# Patient Record
Sex: MALE | Race: WHITE | Employment: FULL TIME | URBAN - METROPOLITAN AREA
[De-identification: names, ages, dates, MRNs, and addresses within clinical notes are randomized per-mention and may not be internally consistent; named-entity substitution may affect disease eponyms.]

---

## 2022-03-02 ENCOUNTER — APPOINTMENT (OUTPATIENT)
Dept: RADIOLOGY | Facility: CLINIC | Age: 37
End: 2022-03-02
Attending: EMERGENCY MEDICINE
Payer: COMMERCIAL

## 2022-03-02 ENCOUNTER — HOSPITAL ENCOUNTER (EMERGENCY)
Facility: CLINIC | Age: 37
Discharge: HOME | End: 2022-03-02
Attending: STUDENT IN AN ORGANIZED HEALTH CARE EDUCATION/TRAINING PROGRAM | Admitting: STUDENT IN AN ORGANIZED HEALTH CARE EDUCATION/TRAINING PROGRAM
Payer: COMMERCIAL

## 2022-03-02 VITALS
BODY MASS INDEX: 31.15 KG/M2 | WEIGHT: 230 LBS | OXYGEN SATURATION: 98 % | HEIGHT: 72 IN | RESPIRATION RATE: 16 BRPM | HEART RATE: 87 BPM | TEMPERATURE: 98.5 F | DIASTOLIC BLOOD PRESSURE: 87 MMHG | SYSTOLIC BLOOD PRESSURE: 138 MMHG

## 2022-03-02 DIAGNOSIS — R07.9 CHEST PAIN, UNSPECIFIED TYPE: ICD-10-CM

## 2022-03-02 LAB
ALBUMIN SERPL-MCNC: 4.4 G/DL (ref 3.5–5)
ALP SERPL-CCNC: 83 U/L (ref 45–120)
ALT SERPL W P-5'-P-CCNC: 81 U/L (ref 0–45)
ANION GAP SERPL CALCULATED.3IONS-SCNC: 11 MMOL/L (ref 5–18)
AST SERPL W P-5'-P-CCNC: 50 U/L (ref 0–40)
BASOPHILS # BLD AUTO: 0 10E3/UL (ref 0–0.2)
BASOPHILS NFR BLD AUTO: 0 %
BILIRUB SERPL-MCNC: 0.5 MG/DL (ref 0–1)
BUN SERPL-MCNC: 10 MG/DL (ref 8–22)
CALCIUM SERPL-MCNC: 9.2 MG/DL (ref 8.5–10.5)
CHLORIDE BLD-SCNC: 103 MMOL/L (ref 98–107)
CO2 SERPL-SCNC: 26 MMOL/L (ref 22–31)
CREAT SERPL-MCNC: 0.86 MG/DL (ref 0.7–1.3)
EOSINOPHIL # BLD AUTO: 0.1 10E3/UL (ref 0–0.7)
EOSINOPHIL NFR BLD AUTO: 1 %
ERYTHROCYTE [DISTWIDTH] IN BLOOD BY AUTOMATED COUNT: 12.2 % (ref 10–15)
GFR SERPL CREATININE-BSD FRML MDRD: >90 ML/MIN/1.73M2
GLUCOSE BLD-MCNC: 97 MG/DL (ref 70–125)
HCT VFR BLD AUTO: 45.8 % (ref 40–53)
HGB BLD-MCNC: 15.7 G/DL (ref 13.3–17.7)
HOLD SPECIMEN: NORMAL
IMM GRANULOCYTES # BLD: 0.1 10E3/UL
IMM GRANULOCYTES NFR BLD: 1 %
LYMPHOCYTES # BLD AUTO: 1.8 10E3/UL (ref 0.8–5.3)
LYMPHOCYTES NFR BLD AUTO: 24 %
MCH RBC QN AUTO: 30.1 PG (ref 26.5–33)
MCHC RBC AUTO-ENTMCNC: 34.3 G/DL (ref 31.5–36.5)
MCV RBC AUTO: 88 FL (ref 78–100)
MONOCYTES # BLD AUTO: 0.5 10E3/UL (ref 0–1.3)
MONOCYTES NFR BLD AUTO: 7 %
NEUTROPHILS # BLD AUTO: 5 10E3/UL (ref 1.6–8.3)
NEUTROPHILS NFR BLD AUTO: 67 %
NRBC # BLD AUTO: 0 10E3/UL
NRBC BLD AUTO-RTO: 0 /100
PLATELET # BLD AUTO: 209 10E3/UL (ref 150–450)
POTASSIUM BLD-SCNC: 3.8 MMOL/L (ref 3.5–5)
PROT SERPL-MCNC: 8.3 G/DL (ref 6–8)
RBC # BLD AUTO: 5.21 10E6/UL (ref 4.4–5.9)
SODIUM SERPL-SCNC: 140 MMOL/L (ref 136–145)
TROPONIN I SERPL-MCNC: <0.01 NG/ML (ref 0–0.29)
WBC # BLD AUTO: 7.4 10E3/UL (ref 4–11)

## 2022-03-02 PROCEDURE — 93005 ELECTROCARDIOGRAM TRACING: CPT | Performed by: STUDENT IN AN ORGANIZED HEALTH CARE EDUCATION/TRAINING PROGRAM

## 2022-03-02 PROCEDURE — 36415 COLL VENOUS BLD VENIPUNCTURE: CPT | Performed by: EMERGENCY MEDICINE

## 2022-03-02 PROCEDURE — 84484 ASSAY OF TROPONIN QUANT: CPT | Performed by: EMERGENCY MEDICINE

## 2022-03-02 PROCEDURE — 93005 ELECTROCARDIOGRAM TRACING: CPT | Performed by: EMERGENCY MEDICINE

## 2022-03-02 PROCEDURE — 99284 EMERGENCY DEPT VISIT MOD MDM: CPT | Mod: 25

## 2022-03-02 PROCEDURE — 80053 COMPREHEN METABOLIC PANEL: CPT | Performed by: EMERGENCY MEDICINE

## 2022-03-02 PROCEDURE — 71046 X-RAY EXAM CHEST 2 VIEWS: CPT

## 2022-03-02 PROCEDURE — 85014 HEMATOCRIT: CPT | Performed by: EMERGENCY MEDICINE

## 2022-03-02 NOTE — ED PROVIDER NOTES
Emergency Department Encounter         FINAL IMPRESSION:  Chest pain      ED COURSE AND MEDICAL DECISION MAKING   11:58 AM I met with patient for initial interview and exam. PPE includes surgical mask.     ED Course as of 03/02/22 1249   Wed Mar 02, 2022   1210 Patient is an obese 36-year-old male here with concerns of hypertension and chest pain.  Patient states the chest pain has been going on for months possibly years.  It sounds like patient's wife is currently pregnant and has been struggling with hypertension in pregnancy.  Has been taking his blood pressures at home with her and states that yesterday he noticed his blood pressures in the 150s over 100s.  States he is mildly short of breath at times.  No leg swelling, hemoptysis recent surgeries or recent travel suggesting PE.  He is PERC criteria negative.  Anterior chest discomfort.  States months ago he had radiation to his back however today has no radiation to his back or his abdomen.  On arrival he has some anterior chest discomfort.  States this episode started yesterday.  No fevers chills nausea vomiting diaphoresis or shortness of breath.  No cough.  No urination issues.  Arrival he looks well.  Mildly hypertensive.  Heart and lungs normal.  Abdomen benign.  Plan for labs chest x-ray and reevaluate   1212 EKG is sinus rhythm rate of 93, QTc 422, no signs acute ischemia, no inversions no depressions no STEMI criteria and old EKGs for comparison.   1216 Heart score of 2.  PERC criteria negative     -Labs normal.  Chest x-ray clear.  Plan for discharge home with close outpatient follow-up.  Patient has an appointment tomorrow with his primary care doctor.  Recommending follow-up for his blood pressure.    At the conclusion of the encounter I discussed the results of all the tests and the disposition. The questions were answered. The patient or family acknowledged understanding and was agreeable with the care plan.                  MEDICATIONS GIVEN IN  "THE EMERGENCY DEPARTMENT:  Medications - No data to display    NEW PRESCRIPTIONS STARTED AT TODAY'S ED VISIT:  New Prescriptions    No medications on file       HPI     Patient information obtained from: Patient     Use of Interpretor: N/A     Esteban Ayers is a 36 year old male with a pertinent history of asthma who presents to this ED by walk-in for evaluation of chest pain.    For the past year, patient reports intermittent chest pain that became persistent since 3/1 (a day ago). The pain is located to the left chest. He notes prior instances of radiation to the back but denies any radiation to the back or abdomen recently. Patient describes the pain as a \"dull ache.\" Patient has been stretching without relief. He has been using his pregnant rosemarie's blood pressure monitor at home and has noticed it has been elevated in the 150s/100s lately. At present, patient endorses mild lightheadedness and tingling to the bilateral hands. Patient had a virtual visit for these symptoms this morning and was instructed to present to the ED for evaluation. Denies nausea, vomiting, dizziness, fever, chills, diarrhea, abdominal pain, urinary frequency, dysuria, hematuria, difficulty urinating, diaphoresis, weight loss, hemoptysis, or any other complaints. No personal history of DVT/PE, recent travel, recent surgeries, or family history of significant cardiac issues. Of note, patient mentions he has not had any medical care for several years. He has an appointment with a PCP on 3/3 (tomorrow).    Social history: Patient endorses current cigarette use. Hoping to quit before rosemarie delivers baby.    REVIEW OF SYSTEMS:  Review of Systems   Constitutional: Negative for fever, malaise, chills, diaphoresis, and weight loss  HEENT: Negative runny nose, sore throat, ear pain, neck pain  Respiratory: Negative for shortness of breath, cough, congestion, hemoptysis  Cardiovascular: Negative for leg edema Positive for chest " pain  Gastrointestinal: Negative for abdominal distention, abdominal pain, constipation, vomiting, nausea, diarrhea  Genitourinary: Negative for dysuria, frequency, difficulty urinating, and hematuria.   Integument: Negative for rash, skin breakdown  Neurological: Negative for , weakness, headache, dizziness. Positive for lightheadedness and tingling to bilateral hands  Musculoskeletal: Negative for joint pain, joint swelling      All other systems reviewed and are negative.      MEDICAL HISTORY     History reviewed. No pertinent past medical history.    History reviewed. No pertinent surgical history.    Social History     Tobacco Use     Smoking status: None     Smokeless tobacco: None   Substance Use Topics     Alcohol use: None     Drug use: None       hydrocodone-acetaminophen (VICODIN) 5-500 MG per tablet            PHYSICAL EXAM     BP (!) 170/110   Pulse 98   Temp 98.5  F (36.9  C) (Oral)   Resp 16   Ht 1.829 m (6')   Wt 104.3 kg (230 lb)   SpO2 99%   BMI 31.19 kg/m        PHYSICAL EXAM:     General: Patient appears well, nontoxic, comfortable  HEENT: Moist mucous membranes, no tongue swelling.  No head trauma.  No midline neck pain.  Cardiovascular: Normal rate, normal rhythm, no extremity edema.  No appreciable murmur. Mildly hypertensive.  Respiratory: No signs of respiratory distress, lungs are clear to auscultation bilaterally with no wheezes rhonchi or rales.  Abdominal: Soft, nontender, nondistended, no palpable masses, no guarding, no rebound  Musculoskeletal: Full range of motion of joints, no deformities appreciated.  Neurological: Alert and oriented, grossly neurologically intact.  Psychological: Normal affect and mood.  Integument: No rashes appreciated        RESULTS       Labs Ordered and Resulted from Time of ED Arrival to Time of ED Departure   COMPREHENSIVE METABOLIC PANEL - Abnormal       Result Value    Sodium 140      Potassium 3.8      Chloride 103      Carbon Dioxide (CO2) 26       Anion Gap 11      Urea Nitrogen 10      Creatinine 0.86      Calcium 9.2      Glucose 97      Alkaline Phosphatase 83      AST 50 (*)     ALT 81 (*)     Protein Total 8.3 (*)     Albumin 4.4      Bilirubin Total 0.5      GFR Estimate >90     TROPONIN I - Normal    Troponin I <0.01     CBC WITH PLATELETS AND DIFFERENTIAL    WBC Count 7.4      RBC Count 5.21      Hemoglobin 15.7      Hematocrit 45.8      MCV 88      MCH 30.1      MCHC 34.3      RDW 12.2      Platelet Count 209      % Neutrophils 67      % Lymphocytes 24      % Monocytes 7      % Eosinophils 1      % Basophils 0      % Immature Granulocytes 1      NRBCs per 100 WBC 0      Absolute Neutrophils 5.0      Absolute Lymphocytes 1.8      Absolute Monocytes 0.5      Absolute Eosinophils 0.1      Absolute Basophils 0.0      Absolute Immature Granulocytes 0.1      Absolute NRBCs 0.0         Chest XR,  PA & LAT   Final Result   IMPRESSION: Lungs are clear. No pleural effusion. Heart size and pulmonary vascularity within normal limits.                        PROCEDURES:  Procedures:  Procedures       I, Court Ramon am serving as a scribe to document services personally performed by Rylan Caruso DO, based on my observations and the provider's statements to me.  I, Rylan Caruso DO, attest that Court Ramon is acting in a scribe capacity, has observed my performance of the services and has documented them in accordance with my direction.    Rylan Caruso DO  Emergency Medicine  [facility] EMERGENCY ROOM     Rylan Caruso DO  03/02/22 [id number]

## 2022-03-02 NOTE — ED TRIAGE NOTES
The patient presents to the ED with c/o left-sided chest pain since yesterday. Now reporting feeling lightheaded since this morning. DBP has been above 100. EKG done in triage.

## 2022-03-02 NOTE — DISCHARGE INSTRUCTIONS
Your blood work today was reassuring.  No signs of heart damage.  Your EKG was also reassuring with no signs of abnormal heart rhythm.  Your chest x-ray was clear.    Your liver enzymes are mildly elevated.  Please discuss this with your primary care doctor tomorrow.  I also put in a clinic referral for our rapid access cardiology clinic.  They will call you tomorrow to follow-up in the next week or so.  If your pain becomes too much, you have fainting episodes, trouble breathing, or any other concerning symptoms, please return to the ER

## 2022-03-09 LAB
ATRIAL RATE - MUSE: 93 BPM
DIASTOLIC BLOOD PRESSURE - MUSE: NORMAL MMHG
INTERPRETATION ECG - MUSE: NORMAL
P AXIS - MUSE: 41 DEGREES
PR INTERVAL - MUSE: 146 MS
QRS DURATION - MUSE: 92 MS
QT - MUSE: 340 MS
QTC - MUSE: 422 MS
R AXIS - MUSE: 40 DEGREES
SYSTOLIC BLOOD PRESSURE - MUSE: NORMAL MMHG
T AXIS - MUSE: 24 DEGREES
VENTRICULAR RATE- MUSE: 93 BPM